# Patient Record
Sex: MALE | Race: BLACK OR AFRICAN AMERICAN | NOT HISPANIC OR LATINO | URBAN - NONMETROPOLITAN AREA
[De-identification: names, ages, dates, MRNs, and addresses within clinical notes are randomized per-mention and may not be internally consistent; named-entity substitution may affect disease eponyms.]

---

## 2019-07-17 ENCOUNTER — IMPORTED ENCOUNTER (OUTPATIENT)
Dept: URBAN - NONMETROPOLITAN AREA CLINIC 1 | Facility: CLINIC | Age: 49
End: 2019-07-17

## 2019-07-17 PROBLEM — H44.113: Noted: 2019-07-17

## 2019-07-17 PROCEDURE — 99203 OFFICE O/P NEW LOW 30 MIN: CPT

## 2019-07-17 NOTE — PATIENT DISCUSSION
Panuveitis OU- Ok to use Pred Acetate 1% TID OU or on a PRN basis. - Recommend patient return to Dr. Day Calixto for follow-up care.

## 2022-04-09 ASSESSMENT — VISUAL ACUITY
OD_SC: 20/40
OS_SC: 20/30-2
OD_PH: 20/30